# Patient Record
(demographics unavailable — no encounter records)

---

## 2025-04-26 NOTE — HISTORY OF PRESENT ILLNESS
[de-identified] : Anual Check up. No hx of Medical problems Needs Blood tests, MMR titers and TB test

## 2025-04-26 NOTE — ASSESSMENT
[FreeTextEntry1] : Hx Depression Continue Welbutryn Psych FU   Anuual Check up CBC CMP Thyroid UA QFT MMR Varicella Titers [Vaccines Reviewed] : Immunizations reviewed today. Please see immunization details in the vaccine log within the immunization flowsheet.

## 2025-04-26 NOTE — HISTORY OF PRESENT ILLNESS
[de-identified] : Anual Check up. No hx of Medical problems Needs Blood tests, MMR titers and TB test